# Patient Record
Sex: FEMALE | Race: BLACK OR AFRICAN AMERICAN | NOT HISPANIC OR LATINO | Employment: UNEMPLOYED | URBAN - METROPOLITAN AREA
[De-identification: names, ages, dates, MRNs, and addresses within clinical notes are randomized per-mention and may not be internally consistent; named-entity substitution may affect disease eponyms.]

---

## 2019-03-26 ENCOUNTER — HOSPITAL ENCOUNTER (OUTPATIENT)
Dept: MAMMOGRAPHY | Facility: HOSPITAL | Age: 49
Discharge: HOME | End: 2019-03-26
Attending: NURSE PRACTITIONER

## 2019-10-10 ENCOUNTER — HOSPITAL ENCOUNTER (OUTPATIENT)
Dept: LAB | Facility: HOSPITAL | Age: 49
Discharge: HOME | End: 2019-10-10

## 2019-10-10 LAB
BASOPHILS # BLD AUTO: 0.04 10*3/UL (ref 0–0.2)
BASOPHILS NFR BLD AUTO: 1 % (ref 0–3)
CONV ABS IMM GRAN: 0.01 10*3/UL (ref 0–0.2)
CONV IMMATURE GRAN: 0.2 % (ref 0–1.8)
DEPRECATED RDW RBC AUTO: 46.8 FL (ref 36.4–46.3)
EOSINOPHIL # BLD AUTO: 0.38 10*3/UL (ref 0–0.7)
EOSINOPHIL # BLD AUTO: 9.2 % (ref 0–7)
ERYTHROCYTE [DISTWIDTH] IN BLOOD BY AUTOMATED COUNT: 13.7 % (ref 11.7–14.4)
HCT VFR BLD AUTO: 48.7 % (ref 37–47)
HGB BLD-MCNC: 15.8 G/DL (ref 12–16)
LYMPHOCYTES # BLD AUTO: 1.09 10*3/UL (ref 1–5)
LYMPHOCYTES NFR BLD AUTO: 26.5 % (ref 20–45)
MCH RBC QN AUTO: 29.9 PG (ref 27–31)
MCHC RBC AUTO-ENTMCNC: 32.4 G/DL (ref 33–37)
MCV RBC AUTO: 92.1 FL (ref 81–99)
MONOCYTES # BLD AUTO: 0.44 10*3/UL (ref 0.2–1.2)
MONOCYTES NFR BLD AUTO: 10.7 % (ref 3–10)
NEUTROPHILS # BLD AUTO: 2.15 10*3/UL (ref 2–8)
NEUTROPHILS NFR BLD AUTO: 52.4 % (ref 30–85)
NRBC CBCN: 0 % (ref 0–0.7)
PLATELET # BLD AUTO: 284 10*3/UL (ref 130–400)
PMV BLD AUTO: 10.2 FL (ref 9.4–12.3)
RBC # BLD AUTO: 5.29 10*6/UL (ref 4.2–5.4)
WBC # BLD AUTO: 4.11 10*3/UL (ref 4.8–10.8)

## 2019-10-11 LAB — IGE SERPL-ACNC: 306 K[IU]/ML (ref 0–24)

## 2021-03-24 ENCOUNTER — HOSPITAL ENCOUNTER (OUTPATIENT)
Dept: VACCINE CLINIC | Facility: HOSPITAL | Age: 51
Discharge: HOME | End: 2021-03-24
Attending: INTERNAL MEDICINE

## 2021-04-22 ENCOUNTER — HOSPITAL ENCOUNTER (OUTPATIENT)
Dept: VACCINE CLINIC | Facility: HOSPITAL | Age: 51
Discharge: HOME | End: 2021-04-22
Attending: INTERNAL MEDICINE

## 2021-05-19 ENCOUNTER — HOSPITAL ENCOUNTER (OUTPATIENT)
Dept: GENERAL RADIOLOGY | Facility: HOSPITAL | Age: 51
Discharge: HOME | End: 2021-05-19
Attending: OBSTETRICS & GYNECOLOGY

## 2022-07-21 ENCOUNTER — TRANSCRIBE ORDERS (OUTPATIENT)
Dept: GENERAL RADIOLOGY | Facility: HOSPITAL | Age: 52
End: 2022-07-21

## 2022-07-21 ENCOUNTER — HOSPITAL ENCOUNTER (OUTPATIENT)
Dept: GENERAL RADIOLOGY | Facility: HOSPITAL | Age: 52
Discharge: HOME | End: 2022-07-21
Admitting: ALLERGY & IMMUNOLOGY

## 2022-07-21 DIAGNOSIS — J45.909 ASTHMA ACTION PLAN DECLINED: ICD-10-CM

## 2022-07-21 DIAGNOSIS — J45.909 ASTHMA ACTION PLAN DECLINED: Primary | ICD-10-CM

## 2022-07-21 PROCEDURE — 71046 X-RAY EXAM CHEST 2 VIEWS: CPT

## 2023-10-19 ENCOUNTER — TRANSCRIBE ORDERS (OUTPATIENT)
Dept: ADMINISTRATIVE | Facility: HOSPITAL | Age: 53
End: 2023-10-19
Payer: COMMERCIAL

## 2023-10-19 DIAGNOSIS — Z12.31 SCREENING MAMMOGRAM FOR HIGH-RISK PATIENT: Primary | ICD-10-CM

## 2023-11-07 ENCOUNTER — HOSPITAL ENCOUNTER (OUTPATIENT)
Dept: MAMMOGRAPHY | Facility: HOSPITAL | Age: 53
Discharge: HOME | End: 2023-11-07
Admitting: NURSE PRACTITIONER
Payer: COMMERCIAL

## 2023-11-07 DIAGNOSIS — Z12.31 SCREENING MAMMOGRAM FOR HIGH-RISK PATIENT: ICD-10-CM

## 2023-11-07 PROCEDURE — 77067 SCR MAMMO BI INCL CAD: CPT

## 2023-11-07 PROCEDURE — 77063 BREAST TOMOSYNTHESIS BI: CPT

## 2024-04-17 NOTE — PROGRESS NOTES
"GYN Visit    Chief Complaint   Patient presents with    Follow-up     Fu on pap        HPI:   53 y.o. with mirena IUD placed 2015 here for abnormal pap.  + hot flashes No bleeding. Pt had pap ASCUS-can't r/o HGSIL HPV negative. Pt considering removal IUD      History: PMHx, Meds, Allergies, PSHx, Social Hx, and POBHx all reviewed and updated.    PHYSICAL EXAM:  /81   Pulse 90   Ht 167.6 cm (66\")   Wt 92.9 kg (204 lb 12.8 oz)   Breastfeeding No   BMI 33.06 kg/m²   General- NAD, alert and oriented, appropriate  Psych- Normal mood, good memory          EXTERNAL MEDICAL RECORDS - SCAN -  PAP,[facility]/ELISSA VILLALPANDO,04/10/24 (2024)    ASSESSMENT AND PLAN:  Diagnoses and all orders for this visit:    1. Pap smear of cervix with ASCUS, cannot exclude HGSIL (Primary)    2. Encounter for other general counseling or advice on contraception    Pt with pap ASCUS can't exclude HSIL. HPV hi risk negative.  She is non smoker. We discussed need for colposcopy. She has a mirena IUD.  She has questions about menopause and need for birth control. At age 53, I told pt she has <8% chance of pregnancy.  She is having hot flashes so I think it is safe to remove the IUD if she desires.  We can remove at time of colposcopy.  Pt is going on a vacation.  We will wait until she returns to remove IUD and perform colposcopy.  Pt was given opportunity to ask questions and her questions were answered to her satisfaction.        Follow Up:  Return for pls set up colpo.          Madeleine Moraes DO  2024    [facility]  [facility] [place] [address]  Dept: [phone]  Dept Fax: [phone]  Loc: [phone]  Loc Fax: [phone]     "

## 2024-04-18 ENCOUNTER — OFFICE VISIT (OUTPATIENT)
Dept: OBSTETRICS AND GYNECOLOGY | Facility: CLINIC | Age: 54
End: 2024-04-18
Payer: COMMERCIAL

## 2024-04-18 VITALS
SYSTOLIC BLOOD PRESSURE: 133 MMHG | HEART RATE: 90 BPM | DIASTOLIC BLOOD PRESSURE: 81 MMHG | HEIGHT: 66 IN | BODY MASS INDEX: 32.92 KG/M2 | WEIGHT: 204.8 LBS

## 2024-04-18 DIAGNOSIS — R87.611 PAP SMEAR OF CERVIX WITH ASCUS, CANNOT EXCLUDE HGSIL: Primary | ICD-10-CM

## 2024-04-18 DIAGNOSIS — Z30.09 ENCOUNTER FOR OTHER GENERAL COUNSELING OR ADVICE ON CONTRACEPTION: ICD-10-CM

## 2024-04-23 ENCOUNTER — TRANSCRIBE ORDERS (OUTPATIENT)
Dept: ADMINISTRATIVE | Facility: HOSPITAL | Age: 54
End: 2024-04-23
Payer: COMMERCIAL

## 2024-04-23 DIAGNOSIS — E03.8 CENTRAL HYPOTHYROIDISM: Primary | ICD-10-CM

## 2024-05-21 ENCOUNTER — HOSPITAL ENCOUNTER (OUTPATIENT)
Dept: ULTRASOUND IMAGING | Facility: HOSPITAL | Age: 54
Discharge: HOME | End: 2024-05-21
Admitting: NURSE PRACTITIONER
Payer: COMMERCIAL

## 2024-05-21 DIAGNOSIS — E03.8 CENTRAL HYPOTHYROIDISM: ICD-10-CM

## 2024-05-21 PROCEDURE — 76536 US EXAM OF HEAD AND NECK: CPT

## 2024-05-31 NOTE — PROGRESS NOTES
"Colposcopy    CC:  Pt presents for colposcopy  Chief Complaint   Patient presents with    Procedure     Colpo    IUD removal         Tobacco/Nicotine use:  no  Consent signed: yes  S/p Gardisil vaccine: no    Pap result: ASCUS 4/10/24   Lab Results   Component Value Date    POCPREGUR Negative 2024        Procedure reviewed in detail.  She understands the potential risks include, but are not limited to, pain, bleeding, and infection.  Her questions have been answered.    Subjective/HPI:  53 y.o.No obstetric history on file.   Social History     Substance and Sexual Activity   Sexual Activity Yes    Partners: Male    Birth control/protection: I.U.D.       54 y/o  with mirena IUD here for colpo due to pap ASCUS can't exclude HSIL HPV +. Pt desires IUD removal    Vital Signs:  /86   Pulse 87   Ht 167.6 cm (66\")   Wt 93 kg (205 lb)   Breastfeeding No   BMI 33.09 kg/m² chaperone present    Physical Exam  Constitutional:       Appearance: Normal appearance.   Skin:     General: Skin is warm and dry.   Neurological:      General: No focal deficit present.      Mental Status: She is alert and oriented to person, place, and time.   Psychiatric:         Mood and Affect: Mood normal.         Behavior: Behavior normal.       External genitalia- Without lesion   Perineum- Without lesion  Vagina- Without lesion   Cervix- Adequate 100%TZ seen, AWL, Biopsies taken at lesion site/s 11 o'clock, ECC performed, Monsels for hemostasis  IUD strings visualized before coloscopy and grasped with forcep, removed intact  and given to patient at her request  Patient tolerated the procedure well.  Chaperone present during pelvic exam.    EXTERNAL MEDICAL RECORDS - SCAN -  PAP,[facility]/ELISSA VILLALPANDO,04/10/24 (2024)    Assessment and Plan:  Diagnoses and all orders for this visit:    1. Pap smear of cervix with ASCUS, cannot exclude HGSIL (Primary)  -     Cancel: Tissue Pathology Exam  -     Tissue Pathology " Exam    2. Pregnancy test negative  -     POC Pregnancy, Urine    3. Encounter for IUD removal        Counseling:    We discussed her Pap diagnosis and HPV in detail.  HPV incidence, transmission, course, remission, progression, types, cervical cancer, genital warts, monitoring, and importance of compliance were reviewed.  Importance of maintaining a healthy lifestyle    She understands the need for continued follow up until she is cleared to return to routine screening pap smears.  She understands the importance of follow up and consequences with lack of follow up (i.e. potential for cervical cancer).  Follow up usually ranges every 12 months, rarely sooner.      PRECAUTIONS - It is common to have bright red spotting and dark discharge after today.  If she has had cervical biopsies, she is to avoid intercourse or tampons as directed for 7 days.  She can use OTC pain relievers prn.  She needs to return to office or ER (if after hours/weekends) if she has pelvic pain, bleeding > 1 pad/2 hours, discharge that has a bad vaginal odor or vaginal itching, fever > 101.5, or any other concerns.        Follow Up:  No follow-ups on file.      Madeleine Moraes DO  06/03/2024    [facility]  [facility] [place] [address]  Dept: [phone]  Dept Fax: [phone]  Loc: [phone]  Loc Fax: [phone]

## 2024-06-03 ENCOUNTER — PROCEDURE VISIT (OUTPATIENT)
Dept: OBSTETRICS AND GYNECOLOGY | Facility: CLINIC | Age: 54
End: 2024-06-03
Payer: COMMERCIAL

## 2024-06-03 VITALS
HEIGHT: 66 IN | WEIGHT: 205 LBS | SYSTOLIC BLOOD PRESSURE: 127 MMHG | HEART RATE: 87 BPM | BODY MASS INDEX: 32.95 KG/M2 | DIASTOLIC BLOOD PRESSURE: 86 MMHG

## 2024-06-03 DIAGNOSIS — Z32.02 PREGNANCY TEST NEGATIVE: ICD-10-CM

## 2024-06-03 DIAGNOSIS — R87.611 PAP SMEAR OF CERVIX WITH ASCUS, CANNOT EXCLUDE HGSIL: Primary | ICD-10-CM

## 2024-06-03 DIAGNOSIS — Z30.432 ENCOUNTER FOR IUD REMOVAL: ICD-10-CM

## 2024-06-03 LAB
B-HCG UR QL: NEGATIVE
EXPIRATION DATE: NORMAL
INTERNAL NEGATIVE CONTROL: NORMAL
INTERNAL POSITIVE CONTROL: NORMAL
Lab: NORMAL

## 2024-06-03 PROCEDURE — 57454 BX/CURETT OF CERVIX W/SCOPE: CPT | Performed by: OBSTETRICS & GYNECOLOGY

## 2024-06-03 PROCEDURE — 88305 TISSUE EXAM BY PATHOLOGIST: CPT | Performed by: OBSTETRICS & GYNECOLOGY

## 2024-06-03 PROCEDURE — 58301 REMOVE INTRAUTERINE DEVICE: CPT | Performed by: OBSTETRICS & GYNECOLOGY

## 2024-06-03 PROCEDURE — 81025 URINE PREGNANCY TEST: CPT | Performed by: OBSTETRICS & GYNECOLOGY

## 2024-06-05 LAB
CYTO UR: NORMAL
LAB AP CASE REPORT: NORMAL
LAB AP CLINICAL INFORMATION: NORMAL
PATH REPORT.FINAL DX SPEC: NORMAL
PATH REPORT.GROSS SPEC: NORMAL